# Patient Record
Sex: FEMALE | Race: WHITE | Employment: PART TIME | URBAN - METROPOLITAN AREA
[De-identification: names, ages, dates, MRNs, and addresses within clinical notes are randomized per-mention and may not be internally consistent; named-entity substitution may affect disease eponyms.]

---
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
## 2022-04-12 ENCOUNTER — HOSPITAL ENCOUNTER (INPATIENT)
Age: 22
LOS: 6 days | Discharge: HOME | DRG: 885 | End: 2022-04-18
Attending: STUDENT IN AN ORGANIZED HEALTH CARE EDUCATION/TRAINING PROGRAM | Admitting: PSYCHIATRY & NEUROLOGY
Payer: COMMERCIAL

## 2022-04-12 DIAGNOSIS — R45.851 VERBALIZES SUICIDAL THOUGHTS: Primary | ICD-10-CM

## 2022-04-12 PROBLEM — F32.9 MAJOR DEPRESSIVE DISORDER: Status: ACTIVE | Noted: 2022-04-12

## 2022-04-12 LAB
ALBUMIN SERPL-MCNC: 3.7 G/DL (ref 3.5–5)
ALBUMIN/GLOB SERPL: 1 {RATIO} (ref 1.1–2.2)
ALP SERPL-CCNC: 91 U/L (ref 45–117)
ALT SERPL-CCNC: 22 U/L (ref 12–78)
AMPHET UR QL SCN: NEGATIVE
ANION GAP SERPL CALC-SCNC: 6 MMOL/L (ref 5–15)
APAP SERPL-MCNC: <2 UG/ML (ref 10–30)
APPEARANCE UR: CLEAR
AST SERPL-CCNC: 11 U/L (ref 15–37)
BACTERIA URNS QL MICRO: NEGATIVE /HPF
BARBITURATES UR QL SCN: NEGATIVE
BASOPHILS # BLD: 0.1 K/UL (ref 0–0.1)
BASOPHILS NFR BLD: 1 % (ref 0–1)
BENZODIAZ UR QL: NEGATIVE
BILIRUB SERPL-MCNC: 0.3 MG/DL (ref 0.2–1)
BILIRUB UR QL: NEGATIVE
BUN SERPL-MCNC: 8 MG/DL (ref 6–20)
BUN/CREAT SERPL: 9 (ref 12–20)
CALCIUM SERPL-MCNC: 8.3 MG/DL (ref 8.5–10.1)
CANNABINOIDS UR QL SCN: POSITIVE
CHLORIDE SERPL-SCNC: 103 MMOL/L (ref 97–108)
CO2 SERPL-SCNC: 27 MMOL/L (ref 21–32)
COCAINE UR QL SCN: NEGATIVE
COLOR UR: ABNORMAL
CREAT SERPL-MCNC: 0.92 MG/DL (ref 0.55–1.02)
DIFFERENTIAL METHOD BLD: ABNORMAL
DRUG SCRN COMMENT,DRGCM: ABNORMAL
EOSINOPHIL # BLD: 0.3 K/UL (ref 0–0.4)
EOSINOPHIL NFR BLD: 2 % (ref 0–7)
EPITH CASTS URNS QL MICRO: ABNORMAL /LPF
ERYTHROCYTE [DISTWIDTH] IN BLOOD BY AUTOMATED COUNT: 12 % (ref 11.5–14.5)
ETHANOL SERPL-MCNC: <10 MG/DL
FLUAV RNA SPEC QL NAA+PROBE: NOT DETECTED
FLUBV RNA SPEC QL NAA+PROBE: NOT DETECTED
GLOBULIN SER CALC-MCNC: 3.7 G/DL (ref 2–4)
GLUCOSE SERPL-MCNC: 92 MG/DL (ref 65–100)
GLUCOSE UR STRIP.AUTO-MCNC: NEGATIVE MG/DL
HCG UR QL: NEGATIVE
HCT VFR BLD AUTO: 40.4 % (ref 35–47)
HGB BLD-MCNC: 13.9 G/DL (ref 11.5–16)
HGB UR QL STRIP: NEGATIVE
HYALINE CASTS URNS QL MICRO: ABNORMAL /LPF (ref 0–5)
IMM GRANULOCYTES # BLD AUTO: 0 K/UL (ref 0–0.04)
IMM GRANULOCYTES NFR BLD AUTO: 0 % (ref 0–0.5)
KETONES UR QL STRIP.AUTO: ABNORMAL MG/DL
LEUKOCYTE ESTERASE UR QL STRIP.AUTO: NEGATIVE
LYMPHOCYTES # BLD: 4.6 K/UL (ref 0.8–3.5)
LYMPHOCYTES NFR BLD: 39 % (ref 12–49)
MCH RBC QN AUTO: 30.2 PG (ref 26–34)
MCHC RBC AUTO-ENTMCNC: 34.4 G/DL (ref 30–36.5)
MCV RBC AUTO: 87.6 FL (ref 80–99)
METHADONE UR QL: NEGATIVE
MONOCYTES # BLD: 0.8 K/UL (ref 0–1)
MONOCYTES NFR BLD: 6 % (ref 5–13)
NEUTS SEG # BLD: 6.2 K/UL (ref 1.8–8)
NEUTS SEG NFR BLD: 52 % (ref 32–75)
NITRITE UR QL STRIP.AUTO: NEGATIVE
NRBC # BLD: 0 K/UL (ref 0–0.01)
NRBC BLD-RTO: 0 PER 100 WBC
OPIATES UR QL: NEGATIVE
PCP UR QL: NEGATIVE
PH UR STRIP: 6 [PH] (ref 5–8)
PLATELET # BLD AUTO: 410 K/UL (ref 150–400)
PMV BLD AUTO: 9.5 FL (ref 8.9–12.9)
POTASSIUM SERPL-SCNC: 3.6 MMOL/L (ref 3.5–5.1)
PROT SERPL-MCNC: 7.4 G/DL (ref 6.4–8.2)
PROT UR STRIP-MCNC: NEGATIVE MG/DL
RBC # BLD AUTO: 4.61 M/UL (ref 3.8–5.2)
RBC #/AREA URNS HPF: ABNORMAL /HPF (ref 0–5)
SALICYLATES SERPL-MCNC: <1.7 MG/DL (ref 2.8–20)
SARS-COV-2, COV2: NOT DETECTED
SODIUM SERPL-SCNC: 136 MMOL/L (ref 136–145)
SP GR UR REFRACTOMETRY: 1.02 (ref 1–1.03)
UR CULT HOLD, URHOLD: NORMAL
UROBILINOGEN UR QL STRIP.AUTO: 0.2 EU/DL (ref 0.2–1)
WBC # BLD AUTO: 12 K/UL (ref 3.6–11)
WBC URNS QL MICRO: ABNORMAL /HPF (ref 0–4)

## 2022-04-12 PROCEDURE — 65270000029 HC RM PRIVATE

## 2022-04-12 PROCEDURE — 81001 URINALYSIS AUTO W/SCOPE: CPT

## 2022-04-12 PROCEDURE — 81025 URINE PREGNANCY TEST: CPT

## 2022-04-12 PROCEDURE — 36415 COLL VENOUS BLD VENIPUNCTURE: CPT

## 2022-04-12 PROCEDURE — 85025 COMPLETE CBC W/AUTO DIFF WBC: CPT

## 2022-04-12 PROCEDURE — 99285 EMERGENCY DEPT VISIT HI MDM: CPT

## 2022-04-12 PROCEDURE — 80179 DRUG ASSAY SALICYLATE: CPT

## 2022-04-12 PROCEDURE — 84443 ASSAY THYROID STIM HORMONE: CPT

## 2022-04-12 PROCEDURE — 87636 SARSCOV2 & INF A&B AMP PRB: CPT

## 2022-04-12 PROCEDURE — 80053 COMPREHEN METABOLIC PANEL: CPT

## 2022-04-12 PROCEDURE — 82077 ASSAY SPEC XCP UR&BREATH IA: CPT

## 2022-04-12 PROCEDURE — 80307 DRUG TEST PRSMV CHEM ANLYZR: CPT

## 2022-04-12 PROCEDURE — 80143 DRUG ASSAY ACETAMINOPHEN: CPT
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
## 2022-04-13 LAB — TSH SERPL DL<=0.05 MIU/L-ACNC: 2.57 UIU/ML (ref 0.36–3.74)

## 2022-04-13 PROCEDURE — 65220000003 HC RM SEMIPRIVATE PSYCH

## 2022-04-13 PROCEDURE — 74011250637 HC RX REV CODE- 250/637: Performed by: NURSE PRACTITIONER

## 2022-04-13 RX ORDER — IBUPROFEN 200 MG
1 TABLET ORAL DAILY
Status: DISCONTINUED | OUTPATIENT
Start: 2022-04-13 | End: 2022-04-18 | Stop reason: HOSPADM

## 2022-04-13 RX ORDER — DIPHENHYDRAMINE HYDROCHLORIDE 50 MG/ML
50 INJECTION, SOLUTION INTRAMUSCULAR; INTRAVENOUS
Status: DISCONTINUED | OUTPATIENT
Start: 2022-04-13 | End: 2022-04-18 | Stop reason: HOSPADM

## 2022-04-13 RX ORDER — LAMOTRIGINE 25 MG/1
25 TABLET ORAL DAILY
Status: DISCONTINUED | OUTPATIENT
Start: 2022-04-13 | End: 2022-04-18 | Stop reason: HOSPADM

## 2022-04-13 RX ORDER — ELAGOLIX 150 MG/1
25 TABLET, FILM COATED ORAL DAILY
Status: ON HOLD | COMMUNITY
End: 2022-04-13

## 2022-04-13 RX ORDER — HYDROXYZINE 50 MG/1
50 TABLET, FILM COATED ORAL
Status: DISCONTINUED | OUTPATIENT
Start: 2022-04-13 | End: 2022-04-18 | Stop reason: HOSPADM

## 2022-04-13 RX ORDER — BENZTROPINE MESYLATE 1 MG/1
1 TABLET ORAL
Status: DISCONTINUED | OUTPATIENT
Start: 2022-04-13 | End: 2022-04-18 | Stop reason: HOSPADM

## 2022-04-13 RX ORDER — ACETAMINOPHEN 325 MG/1
650 TABLET ORAL
Status: DISCONTINUED | OUTPATIENT
Start: 2022-04-13 | End: 2022-04-18 | Stop reason: HOSPADM

## 2022-04-13 RX ORDER — LEVONORGESTREL 19.5 MG/1
1 INTRAUTERINE DEVICE INTRAUTERINE
COMMUNITY

## 2022-04-13 RX ORDER — OLANZAPINE 5 MG/1
5 TABLET ORAL
Status: DISCONTINUED | OUTPATIENT
Start: 2022-04-13 | End: 2022-04-18 | Stop reason: HOSPADM

## 2022-04-13 RX ORDER — TRAZODONE HYDROCHLORIDE 50 MG/1
50 TABLET ORAL
Status: DISCONTINUED | OUTPATIENT
Start: 2022-04-13 | End: 2022-04-14

## 2022-04-13 RX ORDER — HALOPERIDOL 5 MG/ML
5 INJECTION INTRAMUSCULAR
Status: DISCONTINUED | OUTPATIENT
Start: 2022-04-13 | End: 2022-04-18 | Stop reason: HOSPADM

## 2022-04-13 RX ORDER — ADHESIVE BANDAGE
30 BANDAGE TOPICAL DAILY PRN
Status: DISCONTINUED | OUTPATIENT
Start: 2022-04-13 | End: 2022-04-18 | Stop reason: HOSPADM

## 2022-04-13 RX ORDER — LORAZEPAM 2 MG/ML
1 INJECTION INTRAMUSCULAR
Status: DISCONTINUED | OUTPATIENT
Start: 2022-04-13 | End: 2022-04-18 | Stop reason: HOSPADM

## 2022-04-13 RX ORDER — HYDROXYZINE 50 MG/1
50 TABLET, FILM COATED ORAL
Status: DISCONTINUED | OUTPATIENT
Start: 2022-04-13 | End: 2022-04-13

## 2022-04-13 RX ADMIN — LAMOTRIGINE 25 MG: 25 TABLET ORAL at 10:25

## 2022-04-13 NOTE — ED NOTES
TRANSFER - OUT REPORT:    Verbal report given to Channing Tran RN(name) on [employer]  being transferred to [facility]) for routine progression of care       Report consisted of patients Situation, Background, Assessment and   Recommendations(SBAR). Information from the following report(s) SBAR was reviewed with the receiving nurse. Lines:       Opportunity for questions and clarification was provided.       Patient transported with:   [facility]  HPD
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
## 2022-04-13 NOTE — ED NOTES
Patient was changed into green behavioral health gown and personal belongings were provided to patient's mother(Marcy Kaminski)  per patient's request.

## 2022-04-13 NOTE — PROGRESS NOTES
Admission Medication Reconciliation:    Information obtained from:  patient interview, Insurance claims data, and [facility]  RxQuery data available¹:  YES    Comments/Recommendations: Updated PTA meds/reviewed patient's allergies. 1)  The patient reports that she has been on the following medications in the past:  - citalopram (most recently in 2018)  - fluoxetine - was on for awhile and had dose increases  - sertraline - made her feel like a zombie  - escitalopram (with bupropion added) - made her homicidal    She denies taking any medications prior to admission but does report P[place] IUD. 2)  The [place] Prescription Monitoring Program () was assessed to determine fill history of any controlled medications. The patient has NOT filled any controlled medications in the last  2 years. 3)  Medication changes (since last review): Added  - P[place]o [place] IUD     Removed  - lorazepam (from 2017), fluoxetine (from 2016), elagolix, OCPs   ¹RxQuery pharmacy benefit data reflects medications filled and processed through the patient's insurance, however this data does NOT capture whether the medication was picked up or is currently being taken by the patient. Allergies:  Latex and Wellbutrin [bupropion hcl]    Significant PMH/Disease States:   Past Medical History:   Diagnosis Date    Acute pharyngitis 2007    Bronchitis 2009    Depression     Pneumonia 12/10/2009    URI (upper respiratory infection) 2009     Chief Complaint for this Admission:    Chief Complaint   Patient presents with    Suicidal     Prior to Admission Medications:   Prior to Admission Medications   Prescriptions Last Dose Informant Taking?   levonorgestreL (Kyleena) 17.5 mcg/24 hrs (5 yrs) 19.5 mg IUD IUD   Yes   Si Each by IntraUTERine route.       Facility-Administered Medications: None     Delmy Tovar, GOLDIED

## 2022-04-13 NOTE — INTERDISCIPLINARY ROUNDS
Behavioral Health Interdisciplinary Rounds     Patient Name: Hector Gonsales  Age: 24 y.o. Room/Bed:  746/  Primary Diagnosis: <principal problem not specified>   Admission Status: Voluntary     Readmission within 30 days: no  Power of  in place: no  Patient requires a blocked bed: no          Reason for blocked bed:     VTE Prophylaxis: No    Mobility needs/Fall risk: no  Flu Vaccine : no   Nutritional Plan: no  Consults:          Labs/Testing due today?: no    Sleep hours: 4.5       Participation in Care/Groups:  New pt  Medication Compliant?: new pt  PRNS (last 24 hours): None    Restraints (last 24 hours):  no     CIWA (range last 24 hours):     COWS (range last 24 hours):      Alcohol screening (AUDIT) completed -   AUDIT Score: 2     If applicable, date SBIRT discussed in treatment team AND documented:   AUDIT Screen Score: AUDIT Score: 2      Document Brief Intervention (corresponds directly with the 5 A's, Ask, Advise, Assess, Assist, and Arrange): At- Risk Patients (Score 7-15 for women; 8-15 for men)  Discuss concern patient is drinking at unhealthy levels known to increase risk of alcohol-related health problems. Is Patient ready to commit to change? If No:   Encourage reflection   Discuss short term and long term health risks of consuming alcohol   Barriers to change   Reaffirm willingness to help / Educational materials provided  If Yes:   Set goal  [employer] provided    Harmful use or Dependence (Score 16 or greater)   Discuss short term and long term health risks of consuming alcohol   Recommendations   Negotiate drinking goal   Recommend addiction specialist/center   Arrange follow-up appointments.     Tobacco - patient is a smoker: Have You Used Tobacco in the Past 30 Days: Yes (vapes )  Illegal Drugs use: Have You Used Any Illegal Substances Over the Past 12 Months: Yes    24 hour chart check complete: yes ____________________________________________________________________________________________________________    Patient goal(s) for today: Communicate needs to staff  Treatment team focus/goals: Assess pt, manage medications, discharge planning. Progress note Pts mood is stable and affect is bright. Pt denies current SI, HI, AVH. Pts medication was adjusted. SW will reach out to [facility] for follow up. LOS:  1  Expected LOS: TBD    Financial concerns/prescription coverage:    Family contact:  [employer] [phone]     Family requesting physician contact today:    Discharge plan: TBD  Access to weapons :  Father has a weapon in bedside table.   Outpatient provider(s): TBD  Patient's preferred phone number for follow up call :   Patient's preferred e-mail address :  Participating treatment team members: Leeroy Rivera, MSW, Lakhwinder Staley, LIZ, Darvin Machado, KeshavD,  Leti Kessler NP
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
## 2022-04-13 NOTE — PROGRESS NOTES
Client is bright and euthymic, social and interactive in the milieu. She denies SI   And contracts for safety. She endorses recent and current sleep  and appetite   Disturbances. She denies depression and anxiety on assessment and states she   Is \"fine now\" but that she is \"exhausted\". Will continue to monitor.        Problem: Depressed Mood (Adult/Pediatric)  Goal: *STG: Participates in treatment plan  Outcome: Progressing Towards Goal     Problem: Depressed Mood (Adult/Pediatric)  Goal: *STG: Attends activities and groups  Outcome: Progressing Towards Goal     Problem: Depressed Mood (Adult/Pediatric)  Goal: *STG: Demonstrates reduction in symptoms and increase in insight into coping skills/future focused  Outcome: Progressing Towards Goal

## 2022-04-13 NOTE — BH NOTES
ADMISSION NOTE    Pt arrived at [facility] via ambulatory  Pt is self admission   Dual skin assessment performed by Cassi Mejia RN and Jose Churchill. Valeriy score is 23. Pt did consent to safety. Pt denies SI and HI. Pt does not appear to be responding internally. UDS was positive for THC. Pt was pleasant and cooperative during admission. Will continue to monitor and support every 15 minutes.       Primary Nurse Deeann Closs and Jose Churchill performed a dual skin assessment on this patient No impairment noted  Valeriy score is 23

## 2022-04-13 NOTE — BH NOTES
TRANSFER - IN REPORT:    Verbal report received from Leonie, [facility] (name) on Monica Gonzalez  being received from [facility] (unit) for routine progression of care      Report consisted of patients Situation, Background, Assessment and   Recommendations(SBAR). Information from the following report(s) SBAR, ED Summary and Recent Results was reviewed with the receiving nurse. Opportunity for questions and clarification was provided. Assessment completed upon patients arrival to unit and care assumed.

## 2022-04-13 NOTE — PROGRESS NOTES
Problem: Falls - Risk of  Goal: *Absence of Falls  Description: Document Anna Locket Fall Risk and appropriate interventions in the flowsheet. Outcome: Progressing Towards Goal  Note: Fall Risk Interventions:            Medication Interventions: Teach patient to arise slowly    Elimination Interventions:  Toilet paper/wipes in reach

## 2022-04-13 NOTE — PROGRESS NOTES
Problem: Falls - Risk of  Goal: *Absence of Falls  Description: Document Eric Silva Fall Risk and appropriate interventions in the flowsheet.   Outcome: Progressing Towards Goal  Note: Fall Risk Interventions:            Medication Interventions: Teach patient to arise slowly

## 2022-04-14 PROCEDURE — 65220000003 HC RM SEMIPRIVATE PSYCH

## 2022-04-14 PROCEDURE — 74011250637 HC RX REV CODE- 250/637: Performed by: NURSE PRACTITIONER

## 2022-04-14 RX ORDER — TRAZODONE HYDROCHLORIDE 50 MG/1
50 TABLET ORAL
Status: DISCONTINUED | OUTPATIENT
Start: 2022-04-14 | End: 2022-04-15

## 2022-04-14 RX ADMIN — LAMOTRIGINE 25 MG: 25 TABLET ORAL at 08:33

## 2022-04-14 RX ADMIN — HYDROXYZINE HYDROCHLORIDE 50 MG: 50 TABLET, FILM COATED ORAL at 16:52

## 2022-04-14 RX ADMIN — TRAZODONE HYDROCHLORIDE 50 MG: 50 TABLET ORAL at 20:48

## 2022-04-14 NOTE — PROGRESS NOTES
Problem: Depressed Mood (Adult/Pediatric)  Goal: *STG: Participates in treatment plan  Outcome: Progressing Towards Goal  Pt complies with meds. 1653 Atarax 50 mg administered per pt request, for c/o anxiety.

## 2022-04-14 NOTE — PROGRESS NOTES
Patient received resting in bed with eyes closed. NAD and no complaints noted. Safety measures in place. Will continue to monitor with q15min rounds. Problem: Falls - Risk of  Goal: *Absence of Falls  Description: Document Eric Silva Fall Risk and appropriate interventions in the flowsheet. Outcome: Progressing Towards Goal  Note: Fall Risk Interventions:            Medication Interventions: Teach patient to arise slowly    Elimination Interventions:  Toilet paper/wipes in reach

## 2022-04-14 NOTE — BH NOTES
PSYCHIATRIC PROGRESS NOTE       Patient: Cally Belle MRN: [id number]  SSN: [id number]    YOB: 2000  Age: 24 y.o. Sex: female      Admit Date: 4/12/2022    LOS: 2 days       Chief Complaint:  I feel good today. Interval History:  Cally Belle is calm and pleasant. Says she feels well today. She has been attending groups and feels they've beneficial. Says she got a little emotional when sharing in groups. She has been social in the unit. She is tolerating the initiation of lamictal and denies side effects, denies any rash. She is sleeping poorly. Has some anxiety. Denies si hi or avh. Past Medical History:  Past Medical History:   Diagnosis Date    Acute pharyngitis 4/30/2007    Bronchitis 1/2/2009    Depression     Pneumonia 12/10/2009    URI (upper respiratory infection) 7/17/2009         ALLERGIES:(reviewed/updated 4/14/2022)  Allergies   Allergen Reactions    Latex Rash    Wellbutrin [Bupropion Hcl] Other (comments)     Homicidal thoughts       Laboratory report:  Lab Results   Component Value Date/Time    WBC 12.0 (H) 04/12/2022 08:06 PM    HGB 13.9 04/12/2022 08:06 PM    HCT 40.4 04/12/2022 08:06 PM    PLATELET 670 (H) 08/64/5432 08:06 PM    MCV 87.6 04/12/2022 08:06 PM      Lab Results   Component Value Date/Time    Sodium 136 04/12/2022 08:06 PM    Potassium 3.6 04/12/2022 08:06 PM    Chloride 103 04/12/2022 08:06 PM    CO2 27 04/12/2022 08:06 PM    Anion gap 6 04/12/2022 08:06 PM    Glucose 92 04/12/2022 08:06 PM    BUN 8 04/12/2022 08:06 PM    Creatinine 0.92 04/12/2022 08:06 PM    BUN/Creatinine ratio 9 (L) 04/12/2022 08:06 PM    GFR est AA >60 04/12/2022 08:06 PM    GFR est non-AA >60 04/12/2022 08:06 PM    Calcium 8.3 (L) 04/12/2022 08:06 PM    Bilirubin, total 0.3 04/12/2022 08:06 PM    Alk.  phosphatase 91 04/12/2022 08:06 PM    Protein, total 7.4 04/12/2022 08:06 PM    Albumin 3.7 04/12/2022 08:06 PM    Globulin 3.7 04/12/2022 08:06 PM    A-G Ratio 1.0 (L) 04/12/2022 08:06 PM    ALT (SGPT) 22 04/12/2022 08:06 PM      Vitals:    04/13/22 1106 04/13/22 1601 04/13/22 2059 04/14/22 0755   BP: 114/77 115/68 121/83 108/76   Pulse: 91 81 85 73   Resp: 16 16 16 16   Temp: 98 °F (36.7 °C) 98.8 °F (37.1 °C) 99.1 °F (37.3 °C) 97.6 °F (36.4 °C)   SpO2: 98% 98% 98% 98%   Weight:       Height:           No results found for: VALF2, VALAC, VALP, VALPR, DS6, CRBAM, CRBAMP, CARB2, XCRBAM  No results found for: LITHM    Vital Signs  Patient Vitals for the past 24 hrs:   Temp Pulse Resp BP SpO2   04/14/22 0755 97.6 °F (36.4 °C) 73 16 108/76 98 %   04/13/22 2059 99.1 °F (37.3 °C) 85 16 121/83 98 %   04/13/22 1601 98.8 °F (37.1 °C) 81 16 115/68 98 %     Wt Readings from Last 3 Encounters:   04/13/22 70.8 kg (156 lb)   06/08/17 74.7 kg (93 %, Z= 1.47)*   04/03/17 69.7 kg (89 %, Z= 1.22)*     * Growth percentiles are based on CDC (Girls, 2-20 Years) data. Temp Readings from Last 3 Encounters:   04/14/22 97.6 °F (36.4 °C)   04/03/17 98.3 °F (36.8 °C)   11/30/16 98 °F (36.7 °C)     BP Readings from Last 3 Encounters:   04/14/22 108/76   04/03/17 118/75   11/30/16 125/74     Pulse Readings from Last 3 Encounters:   04/14/22 73   04/03/17 100   11/30/16 76       Radiology (reviewed/updated 4/14/2022)  No results found.     Current Facility-Administered Medications   Medication Dose Route Frequency Provider Last Rate Last Admin    OLANZapine (ZyPREXA) tablet 5 mg  5 mg Oral Q6H PRN Tamar Melvin NP        haloperidol lactate (HALDOL) injection 5 mg  5 mg IntraMUSCular Q6H PRN Tamar Melvin NP        benztropine (COGENTIN) tablet 1 mg  1 mg Oral BID PRN Tamar Melvin NP        diphenhydrAMINE (BENADRYL) injection 50 mg  50 mg IntraMUSCular BID PRN Tamar Melvin NP        LORazepam (ATIVAN) injection 1 mg  1 mg IntraMUSCular Q4H PRN Tamar Melvin NP        traZODone (DESYREL) tablet 50 mg  50 mg Oral QHS PRN Tamar Melvin NP        acetaminophen (TYLENOL) tablet 650 mg  650 mg Oral Q4H PRN Tamar Melvin NP        magnesium hydroxide (MILK OF MAGNESIA) 400 mg/5 mL oral suspension 30 mL  30 mL Oral DAILY PRN Tamar Melvin NP        nicotine (NICODERM CQ) 14 mg/24 hr patch 1 Patch  1 Patch TransDERmal DAILY Tamar Melvin NP   1 Patch at 04/14/22 0855    lamoTRIgine (LaMICtal) tablet 25 mg  25 mg Oral DAILY Tamar Melvin NP   25 mg at 04/14/22 0833    hydrOXYzine HCL (ATARAX) tablet 50 mg  50 mg Oral Q6H PRN Tamar Melvin NP           Side Effects: (reviewed/updated 4/14/2022)  None reported or admitted to. Review of Systems: (reviewed/updated 4/14/2022)  Appetite: good  Sleep: good   All other Review of Systems: negative    Mental Status Exam:  Eye contact: Good eye contact  Psychomotor activity: Relaxed   Speech is spontaneous  Thought process: Logical and goal directed   Mood is \"good\"  Affect: Full   Perception: No avh  Suicidal ideation: No si  Homicidal ideation: No hi  Insight/judgment: Partial   Cognition is grossly intact      Physical Exam:  Musculoskeletal system: steady gait  Tremor not present  Cog wheeling not present      Assessment and Plan:  Zaki Kaminski meets criteria for a diagnosis of   Major depressive disorder, recurrent, severe, without psychotic features. Cannabis use disorder. Rule out bipolar II disorder. Lamictal 25 mg daily  Continue rest of medications as prescribed. We will closely monitor for safety. We will encourage reality orientation. Disposition planning to continue.        I certify that this patients inpatient psychiatric hospital services furnished since the previous certification were, and continue to be, required for treatment that could reasonably be expected to improve the patient's condition, or for diagnostic study, and that the patient continues to need, on a daily basis, active treatment furnished directly by or requiring the supervision of inpatient psychiatric facility personnel. In addition, the hospital records show that services furnished were intensive treatment services, admission or related services, or equivalent services.       Signed:  Bibi Canales NP  4/14/2022

## 2022-04-14 NOTE — PROGRESS NOTES
Problem: Depressed Mood (Adult/Pediatric)  Goal: *STG: Participates in treatment plan  Outcome: Progressing Towards Goal  Note: Out on unit engaged w a bright affect, mood stable describing hope and feeling safe. Denies SI with no urges to harm self. Discussed medication adjustments. Pt verbalized understanding.  Staff focus is on offering support and coping skills education  Goal: *STG: Demonstrates reduction in symptoms and increase in insight into coping skills/future focused  Outcome: Progressing Towards Goal  Goal: *STG: Complies with medication therapy  Outcome: Progressing Towards Goal  Goal: Interventions  Outcome: Progressing Towards Goal

## 2022-04-14 NOTE — INTERDISCIPLINARY ROUNDS
Behavioral Health Interdisciplinary Rounds     Patient Name: Lissette Jin  Age: 24 y.o. Room/Bed:  746/  Primary Diagnosis: <principal problem not specified>   Admission Status: Voluntary     Readmission within 30 days: no  Power of  in place: no  Patient requires a blocked bed: no           VTE Prophylaxis: Not indicated    Mobility needs/Fall risk: no  Flu Vaccine : no   Nutritional Plan: no  Consults:          Labs/Testing due today?: no    Sleep hours:        Participation in Care/Groups:  yes  Medication Compliant?: Yes  PRNS (last 24 hours): None    Restraints (last 24 hours):  no     CIWA (range last 24 hours):     COWS (range last 24 hours):      Alcohol screening (AUDIT) completed -   AUDIT Score: 2     If applicable, date SBIRT discussed in treatment team AND documented:   AUDIT Screen Score: AUDIT Score: 2      Document Brief Intervention (corresponds directly with the 5 A's, Ask, Advise, Assess, Assist, and Arrange): At- Risk Patients (Score 7-15 for women; 8-15 for men)  Discuss concern patient is drinking at unhealthy levels known to increase risk of alcohol-related health problems. Is Patient ready to commit to change? If No:   Encourage reflection   Discuss short term and long term health risks of consuming alcohol   Barriers to change   Reaffirm willingness to help / Educational materials provided  If Yes:   Set goal  CellSpin provided    Harmful use or Dependence (Score 16 or greater)   Discuss short term and long term health risks of consuming alcohol   Recommendations   Negotiate drinking goal   Recommend addiction specialist/center   Arrange follow-up appointments.     Tobacco - patient is a smoker: Have You Used Tobacco in the Past 30 Days: Yes (vapes )  Illegal Drugs use: Have You Used Any Illegal Substances Over the Past 12 Months: Yes    24 hour chart check complete: yes ____________________________________________________________________________________________________________    Patient goal(s) for today: Communicate needs to staff  Treatment team focus/goals: Assess pt, medication management, discharge planning  Progress note : Pts mood is stable. Pts affect is bright. Pt will discharge on Monday. SW provided pt with resources for follow up testing. Pt spoke with pts father about securing a weapon.      LOS:  2  Expected LOS: 4/18/22    Financial concerns/prescription coverage:    Family contact:  Felisha Tan, [phone]     Family requesting physician contact today:    Discharge plan: Pt will discharge home  Access to weapons : weapons are secured       Outpatient provider(s): [facility]  Patient's preferred phone number for follow up call :   Patient's preferred e-mail address :  Participating treatment team members: NICOLÁS Alvarado, Tricia Edmondson RN, Star Posey NP

## 2022-04-14 NOTE — BH NOTES
GROUP THERAPY PROGRESS NOTE    Eun Powell is participating in Stress Management. Group time: 1 hour    Group therapy participation: active    Therapeutic interventions reviewed and discussed: Physical and emotional   Symptoms of stress, relaxation techniques, self care behaviors. Impression of participation: Pt was interested and attentive, contributed to   Discussion.
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
## 2022-04-15 LAB
CHOLEST SERPL-MCNC: 151 MG/DL
GLUCOSE P FAST SERPL-MCNC: 87 MG/DL (ref 65–100)
HDLC SERPL-MCNC: 50 MG/DL
HDLC SERPL: 3 {RATIO} (ref 0–5)
LDLC SERPL CALC-MCNC: 86.2 MG/DL (ref 0–100)
TRIGL SERPL-MCNC: 74 MG/DL (ref ?–150)
VLDLC SERPL CALC-MCNC: 14.8 MG/DL

## 2022-04-15 PROCEDURE — 80061 LIPID PANEL: CPT

## 2022-04-15 PROCEDURE — 36415 COLL VENOUS BLD VENIPUNCTURE: CPT

## 2022-04-15 PROCEDURE — 65220000003 HC RM SEMIPRIVATE PSYCH

## 2022-04-15 PROCEDURE — 74011250637 HC RX REV CODE- 250/637: Performed by: NURSE PRACTITIONER

## 2022-04-15 PROCEDURE — 82947 ASSAY GLUCOSE BLOOD QUANT: CPT

## 2022-04-15 RX ORDER — LANOLIN ALCOHOL/MO/W.PET/CERES
6 CREAM (GRAM) TOPICAL
Status: DISCONTINUED | OUTPATIENT
Start: 2022-04-15 | End: 2022-04-17

## 2022-04-15 RX ORDER — TRAZODONE HYDROCHLORIDE 100 MG/1
100 TABLET ORAL
Status: DISCONTINUED | OUTPATIENT
Start: 2022-04-15 | End: 2022-04-18 | Stop reason: HOSPADM

## 2022-04-15 RX ADMIN — LAMOTRIGINE 25 MG: 25 TABLET ORAL at 09:57

## 2022-04-15 RX ADMIN — HYDROXYZINE HYDROCHLORIDE 50 MG: 50 TABLET, FILM COATED ORAL at 09:57

## 2022-04-15 RX ADMIN — HYDROXYZINE HYDROCHLORIDE 50 MG: 50 TABLET, FILM COATED ORAL at 21:06

## 2022-04-15 RX ADMIN — TRAZODONE HYDROCHLORIDE 100 MG: 100 TABLET ORAL at 21:06

## 2022-04-15 NOTE — PROGRESS NOTES
Problem: Falls - Risk of  Goal: *Absence of Falls  Description: Document Marielena Mandes Fall Risk and appropriate interventions in the flowsheet. Outcome: Progressing Towards Goal  Note: Fall Risk Interventions:            Medication Interventions: Teach patient to arise slowly    Elimination Interventions:  Toilet paper/wipes in reach              Problem: Depressed Mood (Adult/Pediatric)  Goal: *STG: Participates in treatment plan  Outcome: Progressing Towards Goal  Goal: *STG: Attends activities and groups  Outcome: Progressing Towards Goal

## 2022-04-15 NOTE — BH NOTES
GROUP THERAPY PROGRESS NOTE     Patient is participating in psychotherapy group.     Group time: 60 minutes     Personal goal for participation: to develop an understanding of cognitive distortions and how to alter our thinking.      Goal orientation: Personal     Group therapy participation:  Active      Therapeutic interventions reviewed and discussed:  Group members were guided through learning about cognitive distortions. Members gained an understanding of how these types of distortions effect perception, relationships, and overall mental health. Members were guided through learning about methods that can be used for changing negative thought patterns. Members were able to identify distortions and engage in discussion about past experiences. Handout provided.     Impression of participation:  Pt was present and engaged in group discussion. Pt added insight to group topic. Pt was supportive of peers. Pts mood was euthymic and congruent with appearance.  Pt was calm, cooperative.         Didi Stokes, NICOLÁS, HP-A

## 2022-04-15 NOTE — INTERDISCIPLINARY ROUNDS
Behavioral Health Interdisciplinary Rounds     Patient Name: Gretel Valadez  Age: 24 y.o. Room/Bed:  746/  Primary Diagnosis: <principal problem not specified>   Admission Status: Voluntary     Readmission within 30 days: no  Power of  in place: no  Patient requires a blocked bed: no          Reason for blocked bed:     VTE Prophylaxis: No    Mobility needs/Fall risk: no  Flu Vaccine : no   Nutritional Plan: no  Consults:          Labs/Testing due today?: yes    Sleep hours: 8       Participation in Care/Groups:  yes  Medication Compliant?: Yes  PRNS (last 24 hours): Antianxiety    Restraints (last 24 hours):  no     CIWA (range last 24 hours):     COWS (range last 24 hours):      Alcohol screening (AUDIT) completed -   AUDIT Score: 2     If applicable, date SBIRT discussed in treatment team AND documented:   AUDIT Screen Score: AUDIT Score: 2      Document Brief Intervention (corresponds directly with the 5 A's, Ask, Advise, Assess, Assist, and Arrange): At- Risk Patients (Score 7-15 for women; 8-15 for men)  Discuss concern patient is drinking at unhealthy levels known to increase risk of alcohol-related health problems. Is Patient ready to commit to change? If No:   Encourage reflection   Discuss short term and long term health risks of consuming alcohol   Barriers to change   Reaffirm willingness to help / Educational materials provided  If Yes:   Set goal  Smish provided    Harmful use or Dependence (Score 16 or greater)   Discuss short term and long term health risks of consuming alcohol   Recommendations   Negotiate drinking goal   Recommend addiction specialist/center   Arrange follow-up appointments.     Tobacco - patient is a smoker: Have You Used Tobacco in the Past 30 Days: Yes (vapes )  Illegal Drugs use: Have You Used Any Illegal Substances Over the Past 12 Months: Yes    24 hour chart check complete: yes ____________________________________________________________________________________________________________    Patient goal(s) for today: Communicate needs to staff  Treatment team focus/goals: Assess pt, medication management, discharge planning  Progress note : Pt reports lesss depressive symptoms. Pt reports irritability and trouble sleeping. Pts medication was adjusted. SW tried to contact father about weapon in the home.  Pt expected to discharge 4/18/22    LOS:  3  Expected LOS: 4/18/22    Financial concerns/prescription coverage:    Family contact:  Monica Giordano, [phone]     Family requesting physician contact today:    Discharge plan: Pt will discharge home  Access to weapons : weapons have been secured     Outpatient provider(s):   Patient's preferred phone number for follow up call : [phone]  Patient's preferred e-mail address :  Participating treatment team members: NICOLÁS Salas, Armani Gaines, LIZ, Isabelle ParrD, Yaneli Franco NP

## 2022-04-15 NOTE — BH NOTES
PRN Medication Documentation    Specific patient behavior that led to need for PRN medication: pt requests Atarax for increased anxiety not relieved by coping skills   Staff interventions attempted prior to PRN being given: education, coping skills  PRN medication given: po 50 mg Atarax  Patient response/effectiveness of PRN medication: reduced anxiety

## 2022-04-15 NOTE — BH NOTES
PSYCHIATRIC PROGRESS NOTE       Patient: Edwin Martinez MRN: [id number]  SSN: [id number]    YOB: 2000  Age: 24 y.o. Sex: female      Admit Date: 4/12/2022    LOS: 3 days       Chief Complaint:  I just feel a little irritable this morning. Interval History:  Edwin Martinez says the vibe in the day room today is off which made her a little more irritable this morning. She denies si hi or avh. Says depression is getting better overall. She is sleeping poorly. Denies any rash from lamictal. She is requesting to go up on the trazodone. She says she isn't ready to go home yet especially with holiday-Easter Sunday. She says she might decline around the holiday due to some issues with dad. At the present time the patient Edwin Martinez remains compliant with taking medications.  Denies any adverse events from taking them and feels they have been beneficial.         Past Medical History:  Past Medical History:   Diagnosis Date    Acute pharyngitis 4/30/2007    Bronchitis 1/2/2009    Depression     Pneumonia 12/10/2009    URI (upper respiratory infection) 7/17/2009         ALLERGIES:(reviewed/updated 4/15/2022)  Allergies   Allergen Reactions    Latex Rash    Wellbutrin [Bupropion Hcl] Other (comments)     Homicidal thoughts       Laboratory report:  Lab Results   Component Value Date/Time    WBC 12.0 (H) 04/12/2022 08:06 PM    HGB 13.9 04/12/2022 08:06 PM    HCT 40.4 04/12/2022 08:06 PM    PLATELET 741 (H) 89/46/8025 08:06 PM    MCV 87.6 04/12/2022 08:06 PM      Lab Results   Component Value Date/Time    Sodium 136 04/12/2022 08:06 PM    Potassium 3.6 04/12/2022 08:06 PM    Chloride 103 04/12/2022 08:06 PM    CO2 27 04/12/2022 08:06 PM    Anion gap 6 04/12/2022 08:06 PM    Glucose 87 04/15/2022 06:16 AM    BUN 8 04/12/2022 08:06 PM    Creatinine 0.92 04/12/2022 08:06 PM    BUN/Creatinine ratio 9 (L) 04/12/2022 08:06 PM    GFR est AA >60 04/12/2022 08:06 PM    GFR est non-AA >60 04/12/2022 08:06 PM Calcium 8.3 (L) 04/12/2022 08:06 PM    Bilirubin, total 0.3 04/12/2022 08:06 PM    Alk. phosphatase 91 04/12/2022 08:06 PM    Protein, total 7.4 04/12/2022 08:06 PM    Albumin 3.7 04/12/2022 08:06 PM    Globulin 3.7 04/12/2022 08:06 PM    A-G Ratio 1.0 (L) 04/12/2022 08:06 PM    ALT (SGPT) 22 04/12/2022 08:06 PM      Vitals:    04/14/22 1144 04/14/22 1526 04/14/22 2007 04/15/22 0820   BP: 113/73 120/78 107/66 101/64   Pulse: 82 98 84 92   Resp: 16 16 16 16   Temp: 98 °F (36.7 °C) 98 °F (36.7 °C) 98.7 °F (37.1 °C) 98.4 °F (36.9 °C)   SpO2: 95% 96% 98% 97%   Weight:       Height:           No results found for: VALF2, VALAC, VALP, VALPR, DS6, CRBAM, CRBAMP, CARB2, XCRBAM  No results found for: LITHM    Vital Signs  Patient Vitals for the past 24 hrs:   Temp Pulse Resp BP SpO2   04/15/22 0820 98.4 °F (36.9 °C) 92 16 101/64 97 %   04/14/22 2007 98.7 °F (37.1 °C) 84 16 107/66 98 %   04/14/22 1526 98 °F (36.7 °C) 98 16 120/78 96 %   04/14/22 1144 98 °F (36.7 °C) 82 16 113/73 95 %     Wt Readings from Last 3 Encounters:   04/13/22 70.8 kg (156 lb)   06/08/17 74.7 kg (93 %, Z= 1.47)*   04/03/17 69.7 kg (89 %, Z= 1.22)*     * Growth percentiles are based on CDC (Girls, 2-20 Years) data. Temp Readings from Last 3 Encounters:   04/15/22 98.4 °F (36.9 °C)   04/03/17 98.3 °F (36.8 °C)   11/30/16 98 °F (36.7 °C)     BP Readings from Last 3 Encounters:   04/15/22 101/64   04/03/17 118/75   11/30/16 125/74     Pulse Readings from Last 3 Encounters:   04/15/22 92   04/03/17 100   11/30/16 76       Radiology (reviewed/updated 4/15/2022)  No results found.     Current Facility-Administered Medications   Medication Dose Route Frequency Provider Last Rate Last Admin    traZODone (DESYREL) tablet 50 mg  50 mg Oral QHS Tamar Melvin NP   50 mg at 04/14/22 2048    OLANZapine (ZyPREXA) tablet 5 mg  5 mg Oral Q6H PRN Tamar Melvin NP        haloperidol lactate (HALDOL) injection 5 mg  5 mg IntraMUSCular Q6H PRN Levester Lacy A, NP        benztropine (COGENTIN) tablet 1 mg  1 mg Oral BID PRN Tamar Melvin NP        diphenhydrAMINE (BENADRYL) injection 50 mg  50 mg IntraMUSCular BID PRN Tamar Melvin NP        LORazepam (ATIVAN) injection 1 mg  1 mg IntraMUSCular Q4H PRN Tamar Melvin NP        acetaminophen (TYLENOL) tablet 650 mg  650 mg Oral Q4H PRN Tamar Melvin NP        magnesium hydroxide (MILK OF MAGNESIA) 400 mg/5 mL oral suspension 30 mL  30 mL Oral DAILY PRN Tamar Melvin NP        nicotine (NICODERM CQ) 14 mg/24 hr patch 1 Patch  1 Patch TransDERmal DAILY Tamar Melvin NP   1 Patch at 04/15/22 0957    lamoTRIgine (LaMICtal) tablet 25 mg  25 mg Oral DAILY Tamar Melvin NP   25 mg at 04/15/22 0957    hydrOXYzine HCL (ATARAX) tablet 50 mg  50 mg Oral Q6H PRN Tamar Melvin NP   50 mg at 04/15/22 0957       Side Effects: (reviewed/updated 4/15/2022)  None reported or admitted to. Review of Systems: (reviewed/updated 4/15/2022)  Appetite: good  Sleep: good   All other Review of Systems: negative    Mental Status Exam:  Eye contact: Good eye contact  Psychomotor activity: Relaxed   Speech is spontaneous  Thought process: Logical and goal directed   Mood is \"good\"  Affect: Full   Perception: No avh  Suicidal ideation: No si  Homicidal ideation: No hi  Insight/judgment: Partial   Cognition is grossly intact      Physical Exam:  Musculoskeletal system: steady gait  Tremor not present  Cog wheeling not present      Assessment and Plan:  Perez Kaminski meets criteria for a diagnosis of   Major depressive disorder, recurrent, severe, without psychotic features. Cannabis use disorder. Rule out bipolar II disorder. Increase trazodone to 100 mg. Continue rest of medications as prescribed. We will closely monitor for safety. We will encourage reality orientation. Disposition planning to continue.        I certify that this patients inpatient psychiatric hospital services furnished since the previous certification were, and continue to be, required for treatment that could reasonably be expected to improve the patient's condition, or for diagnostic study, and that the patient continues to need, on a daily basis, active treatment furnished directly by or requiring the supervision of inpatient psychiatric facility personnel. In addition, the hospital records show that services furnished were intensive treatment services, admission or related services, or equivalent services.       Signed:  Lisa Goldstein NP  4/15/2022

## 2022-04-15 NOTE — PROGRESS NOTES
Problem: Falls - Risk of  Goal: *Absence of Falls  Description: Document Latesha Granado Fall Risk and appropriate interventions in the flowsheet. Outcome: Progressing Towards Goal  Note: Fall Risk Interventions:            Medication Interventions: Teach patient to arise slowly    Elimination Interventions:  Toilet paper/wipes in reach           Pt appears asleep in bed, NAD, even respirations, will continue to monitor q15min

## 2022-04-15 NOTE — PROGRESS NOTES
Problem: Depressed Mood (Adult/Pediatric)  Goal: *STG: Participates in treatment plan  Outcome: Progressing Towards Goal  Note: Out on unit engaged social w peers. Actively working on healthy coping skills. Fleeting SI has decreased.  Reports feeling safe in hospital. Daily goal is to attend groups work on coping skills education  Goal: *STG: Demonstrates reduction in symptoms and increase in insight into coping skills/future focused  Outcome: Progressing Towards Goal  Goal: Interventions  Outcome: Progressing Towards Goal

## 2022-04-16 PROCEDURE — 65220000003 HC RM SEMIPRIVATE PSYCH

## 2022-04-16 PROCEDURE — 74011250637 HC RX REV CODE- 250/637: Performed by: NURSE PRACTITIONER

## 2022-04-16 RX ADMIN — LAMOTRIGINE 25 MG: 25 TABLET ORAL at 08:23

## 2022-04-16 RX ADMIN — HYDROXYZINE HYDROCHLORIDE 50 MG: 50 TABLET, FILM COATED ORAL at 20:46

## 2022-04-16 RX ADMIN — HYDROXYZINE HYDROCHLORIDE 50 MG: 50 TABLET, FILM COATED ORAL at 08:23

## 2022-04-16 RX ADMIN — TRAZODONE HYDROCHLORIDE 100 MG: 100 TABLET ORAL at 20:46

## 2022-04-16 RX ADMIN — Medication 6 MG: at 21:55

## 2022-04-16 NOTE — PROGRESS NOTES
Problem: Falls - Risk of  Goal: *Absence of Falls  Description: Document Belia Ibrahim Fall Risk and appropriate interventions in the flowsheet. Outcome: Progressing Towards Goal  Note: Fall Risk Interventions:            Medication Interventions: Teach patient to arise slowly    Elimination Interventions: Toilet paper/wipes in reach              Problem: Patient Education: Go to Patient Education Activity  Goal: Patient/Family Education  Outcome: Progressing Towards Goal     Problem: Depressed Mood (Adult/Pediatric)  Goal: *STG: Participates in treatment plan  Outcome: Progressing Towards Goal  Note: Pt participated in treatment team. Complaining of poor sleep last night. Denies any thoughts of self harm. Feeling less depressed. Stated Atarax is helping with anxiety.    Goal: Interventions  Outcome: Progressing Towards Goal     Problem: Patient Education: Go to Patient Education Activity  Goal: Patient/Family Education  Outcome: Progressing Towards Goal

## 2022-04-16 NOTE — BH NOTES
Chief Complaint:    Length of Stay: 4 Days    Interval History:  Dario Guajardo reports poor sleep last night. She had taken PRNs but then her roommate was very talkative and she was not able to get as much sleep. Denes SI/HI. Denies medication side effects. \"Everything's been fine. \" Atarax has been very helpful, feels like her head is not running a million minds       Past Medical History:  Past Medical History:   Diagnosis Date    Acute pharyngitis 4/30/2007    Bronchitis 1/2/2009    Depression     Pneumonia 12/10/2009    URI (upper respiratory infection) 7/17/2009           Labs:  Lab Results   Component Value Date/Time    WBC 12.0 (H) 04/12/2022 08:06 PM    HGB 13.9 04/12/2022 08:06 PM    HCT 40.4 04/12/2022 08:06 PM    PLATELET 010 (H) 88/33/2501 08:06 PM    MCV 87.6 04/12/2022 08:06 PM      Lab Results   Component Value Date/Time    Sodium 136 04/12/2022 08:06 PM    Potassium 3.6 04/12/2022 08:06 PM    Chloride 103 04/12/2022 08:06 PM    CO2 27 04/12/2022 08:06 PM    Anion gap 6 04/12/2022 08:06 PM    Glucose 87 04/15/2022 06:16 AM    BUN 8 04/12/2022 08:06 PM    Creatinine 0.92 04/12/2022 08:06 PM    BUN/Creatinine ratio 9 (L) 04/12/2022 08:06 PM    GFR est AA >60 04/12/2022 08:06 PM    GFR est non-AA >60 04/12/2022 08:06 PM    Calcium 8.3 (L) 04/12/2022 08:06 PM    Bilirubin, total 0.3 04/12/2022 08:06 PM    Alk.  phosphatase 91 04/12/2022 08:06 PM    Protein, total 7.4 04/12/2022 08:06 PM    Albumin 3.7 04/12/2022 08:06 PM    Globulin 3.7 04/12/2022 08:06 PM    A-G Ratio 1.0 (L) 04/12/2022 08:06 PM    ALT (SGPT) 22 04/12/2022 08:06 PM      Vitals:    04/14/22 2007 04/15/22 0820 04/15/22 1715 04/16/22 0806   BP: 107/66 101/64 118/83 109/75   Pulse: 84 92 90 88   Resp: 16 16 16 16   Temp: 98.7 °F (37.1 °C) 98.4 °F (36.9 °C) 98.6 °F (37 °C) 98 °F (36.7 °C)   SpO2: 98% 97% 99% 98%   Weight:       Height:             Current Facility-Administered Medications   Medication Dose Route Frequency Provider Last Rate Last Admin    traZODone (DESYREL) tablet 100 mg  100 mg Oral QHS Tamar Melvin NP   100 mg at 04/15/22 2106    melatonin tablet 6 mg  6 mg Oral QHS PRN Tamar Melvin NP        OLANZapine (ZyPREXA) tablet 5 mg  5 mg Oral Q6H PRN Tamar Melvin NP        haloperidol lactate (HALDOL) injection 5 mg  5 mg IntraMUSCular Q6H PRN Tamar Melvin NP        benztropine (COGENTIN) tablet 1 mg  1 mg Oral BID PRN Tamar Melvin NP        diphenhydrAMINE (BENADRYL) injection 50 mg  50 mg IntraMUSCular BID PRN Tamar Melvin NP        LORazepam (ATIVAN) injection 1 mg  1 mg IntraMUSCular Q4H PRN Tamar Melvin NP        acetaminophen (TYLENOL) tablet 650 mg  650 mg Oral Q4H PRN Tamar Melvin NP        magnesium hydroxide (MILK OF MAGNESIA) 400 mg/5 mL oral suspension 30 mL  30 mL Oral DAILY PRN Tamar Melvin NP        nicotine (NICODERM CQ) 14 mg/24 hr patch 1 Patch  1 Patch TransDERmal DAILY Tamar Melvin NP   1 Patch at 04/16/22 0824    lamoTRIgine (LaMICtal) tablet 25 mg  25 mg Oral DAILY Tamar Melvin NP   25 mg at 04/16/22 1557    hydrOXYzine HCL (ATARAX) tablet 50 mg  50 mg Oral Q6H PRN Tamar Melvin NP   50 mg at 04/16/22 0942         Mental Status Exam:  Eye contact:   Grooming: fair  Psychomotor activity: wnl  Speech is spontaneous  Mood is \"fine \"  Affect: mood congruent  Perception: denies avh  Suicidal ideation: denies  Cognition is grossly intact         Physical Exam:  Body habitus: Body mass index is 28.53 kg/m². Musculoskeletal system: normal gait  Tremor - neg  Cog wheeling - neg      Assessment and Plan:  Arlen Kaminski meets criteria for a diagnosis of major depressive disorder, recurrent, severe, without psychotic features, r/o bipolar II disorder    Continue the medication regimen as prescribed  Disposition planning to continue.    A coordinated, multidisplinary treatment team round was conducted with the patient, nurses, pharmcist,  and writer present. Discussions held with , and/or with family members; Complete current electronic health record for patient was reviewed in full including consultant notes, ancillary staff notes, nurses and tech notes, labs and vitals. I certify that this patients inpatient psychiatric hospital services furnished since the previous certification were, and continue to be, required for treatment that could reasonably be expected to improve the patient's condition, or for diagnostic study, and that the patient continues to need, on a daily basis, active treatment furnished directly by or requiring the supervision of inpatient psychiatric facility personnel. In addition, the hospital records show that services furnished were intensive treatment services, admission or related services, or equivalent services.

## 2022-04-16 NOTE — BH NOTES
PRN Medication Documentation    Specific patient behavior that led to need for PRN medication: anxiety  Staff interventions attempted prior to PRN being given: walk hallway  PRN medication given: Atarax 50 mg po prn  Patient response/effectiveness of PRN medication: 9:23 am-less anxious.

## 2022-04-16 NOTE — PROGRESS NOTES
Problem: Falls - Risk of  Goal: *Absence of Falls  Description: Document Sheila Pimentel Fall Risk and appropriate interventions in the flowsheet. Outcome: Progressing Towards Goal  Note: Fall Risk Interventions:            Medication Interventions: Teach patient to arise slowly    Elimination Interventions:  Toilet paper/wipes in reach              Problem: Depressed Mood (Adult/Pediatric)  Goal: *STG: Participates in treatment plan  Outcome: Progressing Towards Goal

## 2022-04-17 PROCEDURE — 74011250637 HC RX REV CODE- 250/637: Performed by: NURSE PRACTITIONER

## 2022-04-17 PROCEDURE — 65220000003 HC RM SEMIPRIVATE PSYCH

## 2022-04-17 RX ORDER — LANOLIN ALCOHOL/MO/W.PET/CERES
9 CREAM (GRAM) TOPICAL
Status: DISCONTINUED | OUTPATIENT
Start: 2022-04-17 | End: 2022-04-18 | Stop reason: HOSPADM

## 2022-04-17 RX ADMIN — TRAZODONE HYDROCHLORIDE 100 MG: 100 TABLET ORAL at 21:03

## 2022-04-17 RX ADMIN — LAMOTRIGINE 25 MG: 25 TABLET ORAL at 08:21

## 2022-04-17 RX ADMIN — ACETAMINOPHEN 650 MG: 325 TABLET ORAL at 18:42

## 2022-04-17 RX ADMIN — HYDROXYZINE HYDROCHLORIDE 50 MG: 50 TABLET, FILM COATED ORAL at 08:21

## 2022-04-17 RX ADMIN — HYDROXYZINE HYDROCHLORIDE 50 MG: 50 TABLET, FILM COATED ORAL at 21:05

## 2022-04-17 NOTE — PROGRESS NOTES
Problem: Falls - Risk of  Goal: *Absence of Falls  Description: Document Marciana Distance Fall Risk and appropriate interventions in the flowsheet. Outcome: Progressing Towards Goal  Note: Fall Risk Interventions:            Medication Interventions: Teach patient to arise slowly    Elimination Interventions: Toilet paper/wipes in reach              Problem: Patient Education: Go to Patient Education Activity  Goal: Patient/Family Education  Outcome: Progressing Towards Goal     Problem: Depressed Mood (Adult/Pediatric)  Goal: *STG: Participates in treatment plan  Outcome: Progressing Towards Goal  Note: Pt participated in treatment team. Stated she was able to sleep better last night. Less anxious during the shift. Mood is brighter and feeling less depressed. No thoughts of self harm.    Goal: Interventions  Outcome: Progressing Towards Goal     Problem: Patient Education: Go to Patient Education Activity  Goal: Patient/Family Education  Outcome: Progressing Towards Goal

## 2022-04-17 NOTE — PROGRESS NOTES
Problem: Falls - Risk of  Goal: *Absence of Falls  Description: Document Anna Locket Fall Risk and appropriate interventions in the flowsheet. Outcome: Progressing Towards Goal  Note: Fall Risk Interventions:    Medication Interventions: Teach patient to arise slowly    Elimination Interventions: Toilet paper/wipes in reach    Patient received resting quietly in bed. No signs of distress. Even and unlabored breathing. Staff will continue to monitor safety q15 and provide support.

## 2022-04-17 NOTE — BH NOTES
PRN Medication Documentation    Specific patient behavior that led to need for PRN medication: anxiety  Staff interventions attempted prior to PRN being given: walking  PRN medication given: Atarax 50 mg po prn  Patient response/effectiveness of PRN medication: 9:21 am-no complaints of anxiety.

## 2022-04-17 NOTE — PROGRESS NOTES
Problem: Falls - Risk of  Goal: *Absence of Falls  Description: Document Haroon Jacinto Fall Risk and appropriate interventions in the flowsheet. 4/17/2022 1532 by Rober Root RN  Outcome: Progressing Towards Goal  Note: Fall Risk Interventions:            Medication Interventions: Teach patient to arise slowly    Elimination Interventions: Toilet paper/wipes in reach           4/17/2022 1022 by Rober Root RN  Outcome: Progressing Towards Goal  Note: Fall Risk Interventions:            Medication Interventions: Teach patient to arise slowly    Elimination Interventions:  Toilet paper/wipes in reach              Problem: Patient Education: Go to Patient Education Activity  Goal: Patient/Family Education  4/17/2022 1532 by Rober Root RN  Outcome: Progressing Towards Goal  4/17/2022 1022 by Rober Root RN  Outcome: Progressing Towards Goal

## 2022-04-17 NOTE — BH NOTES
Chief Complaint:    Length of Stay: 5 Days    Interval History:  Dario Guajardo reports feeling tired today. Slept \"kind of ok\" last night. Says that she did not wake up as much last night but would like to increase melatonin. Denies SI/HI/AH/VH. Denies medication side effects. Asks appropriate questions about medications. She is pleasant and cooperative with the treatment team today. Past Medical History:  Past Medical History:   Diagnosis Date    Acute pharyngitis 4/30/2007    Bronchitis 1/2/2009    Depression     Pneumonia 12/10/2009    URI (upper respiratory infection) 7/17/2009           Labs:  Lab Results   Component Value Date/Time    WBC 12.0 (H) 04/12/2022 08:06 PM    HGB 13.9 04/12/2022 08:06 PM    HCT 40.4 04/12/2022 08:06 PM    PLATELET 639 (H) 89/20/4147 08:06 PM    MCV 87.6 04/12/2022 08:06 PM      Lab Results   Component Value Date/Time    Sodium 136 04/12/2022 08:06 PM    Potassium 3.6 04/12/2022 08:06 PM    Chloride 103 04/12/2022 08:06 PM    CO2 27 04/12/2022 08:06 PM    Anion gap 6 04/12/2022 08:06 PM    Glucose 87 04/15/2022 06:16 AM    BUN 8 04/12/2022 08:06 PM    Creatinine 0.92 04/12/2022 08:06 PM    BUN/Creatinine ratio 9 (L) 04/12/2022 08:06 PM    GFR est AA >60 04/12/2022 08:06 PM    GFR est non-AA >60 04/12/2022 08:06 PM    Calcium 8.3 (L) 04/12/2022 08:06 PM    Bilirubin, total 0.3 04/12/2022 08:06 PM    Alk.  phosphatase 91 04/12/2022 08:06 PM    Protein, total 7.4 04/12/2022 08:06 PM    Albumin 3.7 04/12/2022 08:06 PM    Globulin 3.7 04/12/2022 08:06 PM    A-G Ratio 1.0 (L) 04/12/2022 08:06 PM    ALT (SGPT) 22 04/12/2022 08:06 PM      Vitals:    04/15/22 0820 04/15/22 1715 04/16/22 0806 04/17/22 0756   BP: 101/64 118/83 109/75 118/68   Pulse: 92 90 88 88   Resp: 16 16 16 16   Temp: 98.4 °F (36.9 °C) 98.6 °F (37 °C) 98 °F (36.7 °C) 98.4 °F (36.9 °C)   SpO2: 97% 99% 98% 98%   Weight:    71.2 kg (157 lb)   Height:             Current Facility-Administered Medications   Medication Dose Route Frequency Provider Last Rate Last Admin    traZODone (DESYREL) tablet 100 mg  100 mg Oral QHS Tamar Melvin NP   100 mg at 04/16/22 2046    melatonin tablet 6 mg  6 mg Oral QHS PRN Al REAGAN NP   6 mg at 04/16/22 2155    OLANZapine (ZyPREXA) tablet 5 mg  5 mg Oral Q6H PRN Tamar Melvin NP        haloperidol lactate (HALDOL) injection 5 mg  5 mg IntraMUSCular Q6H PRN Tamar Melvin NP        benztropine (COGENTIN) tablet 1 mg  1 mg Oral BID PRN Tamar Melvin NP        diphenhydrAMINE (BENADRYL) injection 50 mg  50 mg IntraMUSCular BID PRN Tamar Melvin NP        LORazepam (ATIVAN) injection 1 mg  1 mg IntraMUSCular Q4H PRN Tamar Melvin NP        acetaminophen (TYLENOL) tablet 650 mg  650 mg Oral Q4H PRN Tamar Melvin NP        magnesium hydroxide (MILK OF MAGNESIA) 400 mg/5 mL oral suspension 30 mL  30 mL Oral DAILY PRN Tamar Melvin NP        nicotine (NICODERM CQ) 14 mg/24 hr patch 1 Patch  1 Patch TransDERmal DAILY Tamar Melvin NP   1 Patch at 04/17/22 4989    lamoTRIgine (LaMICtal) tablet 25 mg  25 mg Oral DAILY Tamar Melvin NP   25 mg at 04/17/22 0774    hydrOXYzine HCL (ATARAX) tablet 50 mg  50 mg Oral Q6H PRN Tamar Melvin NP   50 mg at 04/17/22 0821         Mental Status Exam:  Eye contact:   Grooming: fair  Psychomotor activity: wnl  Speech is spontaneous  Mood is \"fine \"  Affect: mood congruent  Perception: denies avh  Suicidal ideation: denies  Cognition is grossly intact         Physical Exam:  Body habitus: Body mass index is 28.72 kg/m².   Musculoskeletal system: normal gait  Tremor - neg  Cog wheeling - neg      Assessment and Plan:  Riki Kaminski meets criteria for a diagnosis of major depressive disorder, recurrent, severe, without psychotic features, r/o bipolar II disorder    Increase melatonin to 9mg  Continue the medication regimen as prescribed  Disposition planning to continue. A coordinated, multidisplinary treatment team round was conducted with the patient, nurses, pharmcist,  and writer present. Discussions held with , and/or with family members; Complete current electronic health record for patient was reviewed in full including consultant notes, ancillary staff notes, nurses and tech notes, labs and vitals. I certify that this patients inpatient psychiatric hospital services furnished since the previous certification were, and continue to be, required for treatment that could reasonably be expected to improve the patient's condition, or for diagnostic study, and that the patient continues to need, on a daily basis, active treatment furnished directly by or requiring the supervision of inpatient psychiatric facility personnel. In addition, the hospital records show that services furnished were intensive treatment services, admission or related services, or equivalent services.

## 2022-04-18 VITALS
SYSTOLIC BLOOD PRESSURE: 118 MMHG | RESPIRATION RATE: 16 BRPM | OXYGEN SATURATION: 99 % | TEMPERATURE: 97.8 F | DIASTOLIC BLOOD PRESSURE: 77 MMHG | BODY MASS INDEX: 28.89 KG/M2 | HEART RATE: 84 BPM | HEIGHT: 62 IN | WEIGHT: 157 LBS

## 2022-04-18 PROCEDURE — 74011250637 HC RX REV CODE- 250/637: Performed by: NURSE PRACTITIONER

## 2022-04-18 RX ORDER — HYDROXYZINE 50 MG/1
50 TABLET, FILM COATED ORAL
Qty: 30 TABLET | Refills: 0 | Status: SHIPPED | OUTPATIENT
Start: 2022-04-18

## 2022-04-18 RX ORDER — TRAZODONE HYDROCHLORIDE 100 MG/1
100 TABLET ORAL
Qty: 30 TABLET | Refills: 0 | Status: SHIPPED | OUTPATIENT
Start: 2022-04-18

## 2022-04-18 RX ORDER — LAMOTRIGINE 25 MG/1
25 TABLET ORAL DAILY
Qty: 30 TABLET | Refills: 0 | Status: SHIPPED | OUTPATIENT
Start: 2022-04-19

## 2022-04-18 RX ADMIN — HYDROXYZINE HYDROCHLORIDE 50 MG: 50 TABLET, FILM COATED ORAL at 08:28

## 2022-04-18 RX ADMIN — LAMOTRIGINE 25 MG: 25 TABLET ORAL at 08:28

## 2022-04-18 NOTE — INTERDISCIPLINARY ROUNDS
Behavioral Health Interdisciplinary Rounds     Patient Name: Britton Solares  Age: 24 y.o. Room/Bed:  746/02  Primary Diagnosis: <principal problem not specified>   Admission Status: Voluntary     Readmission within 30 days: no  Power of  in place: no  Patient requires a blocked bed: no          Reason for blocked bed:     VTE Prophylaxis: No    Mobility needs/Fall risk: no  Flu Vaccine : no   Nutritional Plan: no  Consults:          Labs/Testing due today?: no    Sleep hours: 7.5       Participation in Care/Groups:  yes  Medication Compliant?: Yes  PRNS (last 24 hours): Antianxiety and Pain    Restraints (last 24 hours):  no     CIWA (range last 24 hours):     COWS (range last 24 hours):      Alcohol screening (AUDIT) completed -   AUDIT Score: 2     If applicable, date SBIRT discussed in treatment team AND documented:   AUDIT Screen Score: AUDIT Score: 2      Document Brief Intervention (corresponds directly with the 5 A's, Ask, Advise, Assess, Assist, and Arrange): At- Risk Patients (Score 7-15 for women; 8-15 for men)  Discuss concern patient is drinking at unhealthy levels known to increase risk of alcohol-related health problems. Is Patient ready to commit to change? If No:   Encourage reflection   Discuss short term and long term health risks of consuming alcohol   Barriers to change   Reaffirm willingness to help / Educational materials provided  If Yes:   Set goal  [employer] provided    Harmful use or Dependence (Score 16 or greater)   Discuss short term and long term health risks of consuming alcohol   Recommendations   Negotiate drinking goal   Recommend addiction specialist/center   Arrange follow-up appointments.     Tobacco - patient is a smoker: Have You Used Tobacco in the Past 30 Days: Yes (vapes )  Illegal Drugs use: Have You Used Any Illegal Substances Over the Past 12 Months: Yes    24 hour chart check complete: yes ____________________________________________________________________________________________________________    Patient goal(s) for today: Discharge   Treatment team focus/goals: Assess pt, medication management, discharge   Progress note : Pt is stable. Pt denies SI, HI, AVH. Sw provided referral for follow up care.  Pts medication has been adjusted    LOS:  6  Expected LOS: 4/18/22    Financial concerns/prescription coverage:    Family contact:  Saxton Anuj, [phone]     Family requesting physician contact today:    Discharge plan: Pt will discharge home  Access to weapons :  Weapons have been secured        Outpatient provider(s): Sulaiman Bean  Patient's preferred phone number for follow up call : [phone]   Patient's preferred e-mail address :  Participating treatment team members: Leeroy Rivera, MSW, Holly Harvey, LIZ, Darvin Machado, PharmD, Leti Kessler NP

## 2022-04-18 NOTE — BH NOTES
GROUP THERAPY PROGRESS NOTE    Patient is participating in psychotherapy group. Group time: 60 minutes    Personal goal for participation: To gain an understanding of the 12 basic irrational beliefs and identify personal interpretations as they apply. Goal orientation: Personal    Group therapy participation: Active     Therapeutic interventions reviewed and discussed:  Group members were guided through developing an understanding of irrational beliefs and how they affect thought processes and behaviors. Members completed and activity and then engaged in discussion. Handouts provided. Impression of participation:  Pt was present and engaged in group discussion. Pt added insight to group topic. Pt interacted with peers and Sw. Pt asked relevant questions and provided personal experiences with topic. Pt was calm, cooperative.        NICOLÁS Lazo, QMHP-A

## 2022-04-18 NOTE — PROGRESS NOTES
Problem: Falls - Risk of  Goal: *Absence of Falls  Description: Document [place] Fall Risk and appropriate interventions in the flowsheet. Outcome: Progressing Towards Goal  Note: Fall Risk Interventions:            Medication Interventions: Teach patient to arise slowly    Elimination Interventions:  Toilet paper/wipes in reach     Pt appears asleep in bed, NAD, even respirations, will continue to monitor q15min

## 2022-04-18 NOTE — DISCHARGE INSTRUCTIONS
DISCHARGE SUMMARY    NAME:Magi Kaminski  : 2000  MRN: [id number]    The patient Latonia Chong exhibits the ability to control behavior in a less restrictive environment. Patient's level of functioning is improving. No assaultive/destructive behavior has been observed for the past 24 hours. No suicidal/homicidal threat or behavior has been observed for the past 24 hours. There is no evidence of serious medication side effects. Patient has not been in physical or protective restraints for at least the past 24 hours. If weapons involved, how are they secured? Weapons have been secured     Is patient aware of and in agreement with discharge plan? yes    Arrangements for medication:  Prescriptions sent to pharmacy     Copy of discharge instructions to provider?:  yes    Arrangements for transportation home: Mom will pickup    Keep all follow up appointments as scheduled, continue to take prescribed medications per physician instructions.   Mental health crisis number:  630 or your local mental health crisis line number at [address] [facility] Emergency WARM LINE      [phone]-MHAV ([phone])      M-F: 9am to 9pm      Sat & Sun: 5pm - 9pm  National suicide prevention lines:                             1-332-PZPPKPJ (6-361-877-329-214-5648)       5-448-402-TALK ([phone])    Crisis Text Line:  Text HOME to 646693
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
## 2022-04-18 NOTE — PROGRESS NOTES
0700     Problem: Falls - Risk of  Goal: *Absence of Falls  Description: Document Sanya Sanchez Fall Risk and appropriate interventions in the flowsheet. Outcome: Progressing Towards Goal  Note: Fall Risk Interventions:  Medication Interventions: Teach patient to arise slowly  Elimination Interventions:  Toilet paper/wipes in reach  Problem: Patient Education: Go to Patient Education Activity  Goal: Patient/Family Education  Outcome: Progressing Towards Goal  Problem: Depressed Mood (Adult/Pediatric)  Goal: *STG: Participates in treatment plan  Outcome: Progressing Towards Goal
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
